# Patient Record
Sex: FEMALE | Race: WHITE
[De-identification: names, ages, dates, MRNs, and addresses within clinical notes are randomized per-mention and may not be internally consistent; named-entity substitution may affect disease eponyms.]

---

## 2018-07-17 ENCOUNTER — HOSPITAL ENCOUNTER (OUTPATIENT)
Dept: HOSPITAL 80 - BRMIMAGING | Age: 48
End: 2018-07-17
Attending: GENERAL ACUTE CARE HOSPITAL
Payer: COMMERCIAL

## 2018-07-17 DIAGNOSIS — S93.402A: Primary | ICD-10-CM

## 2018-07-27 ENCOUNTER — HOSPITAL ENCOUNTER (OUTPATIENT)
Dept: HOSPITAL 80 - BRMIMAGING | Age: 48
End: 2018-07-27
Attending: INTERNAL MEDICINE
Payer: COMMERCIAL

## 2018-07-27 DIAGNOSIS — S93.402A: Primary | ICD-10-CM

## 2018-10-26 ENCOUNTER — HOSPITAL ENCOUNTER (OUTPATIENT)
Dept: HOSPITAL 80 - FSGY | Age: 48
Discharge: HOME | End: 2018-10-26
Attending: OBSTETRICS & GYNECOLOGY
Payer: COMMERCIAL

## 2018-10-26 VITALS — SYSTOLIC BLOOD PRESSURE: 104 MMHG | DIASTOLIC BLOOD PRESSURE: 70 MMHG

## 2018-10-26 DIAGNOSIS — N84.0: Primary | ICD-10-CM

## 2018-10-26 DIAGNOSIS — N93.8: ICD-10-CM

## 2018-10-26 PROCEDURE — C1782 MORCELLATOR: HCPCS

## 2018-10-26 PROCEDURE — 58563 HYSTEROSCOPY ABLATION: CPT

## 2018-10-26 NOTE — PDANEPAE
ANE Past Medical History





- Cardiovascular History


Hx Hypertension: No


Hx Arrhythmias: No


Hx Chest Pain: No


Hx Coronary Artery / Peripheral Vascular Disease: No


Hx CHF / Valvular Disease: No


Hx Palpitations: No


Cardiovascular History Comment: ankle swelling occ- takes triamterene prn hasn'

t had any in 2 weeks and will stay off until after surgery





- Pulmonary History


Hx COPD: No


Hx Asthma/Reactive Airway Disease: No


Hx Recent Upper Respiratory Infection: No


Hx Oxygen in Use at Home: No


Hx Sleep Apnea: No


Sleep Apnea Screening Result - Last Documented: Negative





- Neurologic History


Hx Cerebrovascular Accident: No


Hx Seizures: No


Hx Dementia: No





- Endocrine History


Hx Diabetes: No





- Renal History


Hx Renal Disorders: No





- Liver History


Hx Hepatic Disorders: No





- Neurological & Psychiatric Hx


Hx Neurological and Psychiatric Disorders: No





- Cancer History


Hx Cancer: No





- Congenital Disorder History


Hx Congenital Disorders: No





- GI History


Hx Gastrointestinal Disorders: No





- Other Health History


Other Health History: wears glasses for driving





- Chronic Pain History


Chronic Pain: No





- Surgical History


Prior Surgeries: oophorectomy- left 2010.  osvaldo 2003





SURAJ Review of Systems


Review of Systems: 








- Exercise capacity


METS (RN): 4 METS





ANE Patient History





- Allergies


Allergies/Adverse Reactions: 








No Known Allergies Allergy (Verified 10/19/18 10:30)


 








- Home Medications


Home Medications: 








Triamterene PRN 10/19/18 [Last Taken 10/05/18]








- NPO status


NPO Since - Liquids (Date): 10/25/18


NPO Since - Liquids (Time): 22:00


NPO Since - Solids (Date): 10/25/18


NPO Since - Solids (Time): 22:00





- Smoking Hx


Smoking Status: Current some day smoker





- Family Anes Hx


Family Hx Anesthesia Complications: none





ANE Labs/Vital Signs





- Vital Signs


Blood Pressure: 140/85


Heart Rate: 62


Respiratory Rate: 16


O2 Sat (%): 96


Height: 162.56 cm


Weight: 98.883 kg





ANE Physical Exam





- Airway


Neck exam: FROM


Mallampati Score: Class 2





- Pulmonary


Pulmonary: no respiratory distress





- Cardiovascular


Cardiovascular: regular rate and rhythym





- ASA Status


ASA Status: III





ANE Anesthesia Plan


Anesthesia Plan: GA w LMA

## 2018-10-26 NOTE — POSTOPPROG
Post Op Note


Date of Operation: 10/26/18


Surgeon: Lucia Dash


Anesthesiologist: Red Ravi


Anesthesia: LMA


Pre-op Diagnosis: DUB , endometrial polyps


Post-op Diagnosis: same


Indication: DUB


Procedure:  H/s polypectomy , Novasure


Findings: post wall polyp


Inf/Abcess present in the surg proc area at time of surgery?: No


EBL: Minimal

## 2018-10-26 NOTE — GOP
DATE OF OPERATION:  10/26/2018



SURGEON:  Lucia Dash MD



ANESTHESIA:  General with LMA.



ANESTHESIOLOGIST:  Teofilo Ravi MD



PREOPERATIVE DIAGNOSIS:  

1.  Dysfunctional uterine bleeding.  

2.  Endometrial polyps.



POSTOPERATIVE DIAGNOSIS:  

1.  Dysfunctional uterine bleeding.  

2.  Endometrial polyps.



PROCEDURE PERFORMED:  Hysteroscopic polypectomy with NovaSure endometrial ablation.



FINDINGS:  The patient has a very retroverted uterus and very elongated with a noted polyp on the pos
terior wall of the uterus and normal tubal ostia, normal cervix.





ESTIMATED BLOOD LOSS:  Minimal.



INDICATIONS:  Patient is a 48-year-old, G0, Essure for birth control, who has had over 9 weeks of per
sistent problematic bleeding.  She went to the ER due to this and also from pain from the bleeding wi
th uterine cramping.  She had ultrasound and ultrasound sonohysterogram.  The sonohysterogram showed 
a 1 cm endometrial polyp and endometrial biopsy showed scant tissue.  The patient desires definitive 
evaluation and treatment and will proceed with the above.



DESCRIPTION OF PROCEDURE:  With informed consent signed, patient taken to the OR and placed under gen
eral anesthesia without complication, placed in the low dorsal lithotomy position, prepped and draped
 in usual sterile fashion.  Bladder emptied previously.  A time-out was performed and the anterior li
p of the cervix was clamped with a tenaculum and cervix gently dilated up to 9.5 mm.  The Painter and N
ephew 9 mm TruClear hysteroscope placed into the uterine cavity and there was some difficulty getting
 around the corner of the uterus, as there was significant retroversion.  Once this is accomplished, 
the uterus was also sounded to 10 cm.  The hysteroscope placed and normal saline used as the filling 
medium with findings as noted above.  The Painter and Nephew morcellator blade was placed into the uter
ine cavity and resection of all the tissue done.  Once all the endometrium had been removed, the Nova
Sure endometrial ablation device placed into the uterine cavity and length was 6.5, width was 4.8.  I
ntegrity test passed and burn time was approximately 45 seconds.  The NovaSure removed and there was 
some bleeding from the cervix, though this was made hemostatic with pressure from a sponge stick.  On
ce this was felt to be hemostatic, the patient placed in the supine position, awakened in the operati
ng room and taken to recovery room in stable condition.  Tolerated procedure well.  Net fluid deficit
 was about 400 cc.



COMPLICATIONS:  None.





Job #:  [id number]